# Patient Record
Sex: MALE | Race: WHITE | NOT HISPANIC OR LATINO | URBAN - METROPOLITAN AREA
[De-identification: names, ages, dates, MRNs, and addresses within clinical notes are randomized per-mention and may not be internally consistent; named-entity substitution may affect disease eponyms.]

---

## 2022-07-13 ENCOUNTER — EMERGENCY (EMERGENCY)
Facility: HOSPITAL | Age: 62
LOS: 1 days | Discharge: HOME | End: 2022-07-13
Attending: STUDENT IN AN ORGANIZED HEALTH CARE EDUCATION/TRAINING PROGRAM
Payer: COMMERCIAL

## 2022-07-13 VITALS
SYSTOLIC BLOOD PRESSURE: 167 MMHG | RESPIRATION RATE: 20 BRPM | OXYGEN SATURATION: 97 % | TEMPERATURE: 98 F | WEIGHT: 195.11 LBS | HEART RATE: 64 BPM | DIASTOLIC BLOOD PRESSURE: 90 MMHG | HEIGHT: 68 IN

## 2022-07-13 VITALS
RESPIRATION RATE: 18 BRPM | DIASTOLIC BLOOD PRESSURE: 94 MMHG | SYSTOLIC BLOOD PRESSURE: 155 MMHG | HEART RATE: 63 BPM | TEMPERATURE: 98 F | OXYGEN SATURATION: 97 %

## 2022-07-13 DIAGNOSIS — Z98.89 OTHER SPECIFIED POSTPROCEDURAL STATES: Chronic | ICD-10-CM

## 2022-07-13 LAB
ALBUMIN SERPL ELPH-MCNC: 4.3 G/DL — SIGNIFICANT CHANGE UP (ref 3.3–5.2)
ALP SERPL-CCNC: 71 U/L — SIGNIFICANT CHANGE UP (ref 40–120)
ALT FLD-CCNC: 13 U/L — SIGNIFICANT CHANGE UP
ANION GAP SERPL CALC-SCNC: 12 MMOL/L — SIGNIFICANT CHANGE UP (ref 5–17)
AST SERPL-CCNC: 20 U/L — SIGNIFICANT CHANGE UP
BASOPHILS # BLD AUTO: 0.02 K/UL — SIGNIFICANT CHANGE UP (ref 0–0.2)
BASOPHILS NFR BLD AUTO: 0.3 % — SIGNIFICANT CHANGE UP (ref 0–2)
BILIRUB SERPL-MCNC: 0.3 MG/DL — LOW (ref 0.4–2)
BUN SERPL-MCNC: 28.2 MG/DL — HIGH (ref 8–20)
CALCIUM SERPL-MCNC: 9.4 MG/DL — SIGNIFICANT CHANGE UP (ref 8.6–10.2)
CHLORIDE SERPL-SCNC: 100 MMOL/L — SIGNIFICANT CHANGE UP (ref 98–107)
CO2 SERPL-SCNC: 25 MMOL/L — SIGNIFICANT CHANGE UP (ref 22–29)
CREAT SERPL-MCNC: 0.99 MG/DL — SIGNIFICANT CHANGE UP (ref 0.5–1.3)
EGFR: 86 ML/MIN/1.73M2 — SIGNIFICANT CHANGE UP
EOSINOPHIL # BLD AUTO: 0.09 K/UL — SIGNIFICANT CHANGE UP (ref 0–0.5)
EOSINOPHIL NFR BLD AUTO: 1.2 % — SIGNIFICANT CHANGE UP (ref 0–6)
GLUCOSE SERPL-MCNC: 106 MG/DL — HIGH (ref 70–99)
HCT VFR BLD CALC: 38.9 % — LOW (ref 39–50)
HGB BLD-MCNC: 13 G/DL — SIGNIFICANT CHANGE UP (ref 13–17)
IMM GRANULOCYTES NFR BLD AUTO: 0.1 % — SIGNIFICANT CHANGE UP (ref 0–1.5)
LYMPHOCYTES # BLD AUTO: 1.98 K/UL — SIGNIFICANT CHANGE UP (ref 1–3.3)
LYMPHOCYTES # BLD AUTO: 25.7 % — SIGNIFICANT CHANGE UP (ref 13–44)
MAGNESIUM SERPL-MCNC: 1.2 MG/DL — LOW (ref 1.6–2.6)
MCHC RBC-ENTMCNC: 28.8 PG — SIGNIFICANT CHANGE UP (ref 27–34)
MCHC RBC-ENTMCNC: 33.4 GM/DL — SIGNIFICANT CHANGE UP (ref 32–36)
MCV RBC AUTO: 86.3 FL — SIGNIFICANT CHANGE UP (ref 80–100)
MONOCYTES # BLD AUTO: 0.6 K/UL — SIGNIFICANT CHANGE UP (ref 0–0.9)
MONOCYTES NFR BLD AUTO: 7.8 % — SIGNIFICANT CHANGE UP (ref 2–14)
NEUTROPHILS # BLD AUTO: 5.01 K/UL — SIGNIFICANT CHANGE UP (ref 1.8–7.4)
NEUTROPHILS NFR BLD AUTO: 64.9 % — SIGNIFICANT CHANGE UP (ref 43–77)
PLATELET # BLD AUTO: 173 K/UL — SIGNIFICANT CHANGE UP (ref 150–400)
POTASSIUM SERPL-MCNC: 4.4 MMOL/L — SIGNIFICANT CHANGE UP (ref 3.5–5.3)
POTASSIUM SERPL-SCNC: 4.4 MMOL/L — SIGNIFICANT CHANGE UP (ref 3.5–5.3)
PROT SERPL-MCNC: 7.1 G/DL — SIGNIFICANT CHANGE UP (ref 6.6–8.7)
RBC # BLD: 4.51 M/UL — SIGNIFICANT CHANGE UP (ref 4.2–5.8)
RBC # FLD: 12.1 % — SIGNIFICANT CHANGE UP (ref 10.3–14.5)
SODIUM SERPL-SCNC: 137 MMOL/L — SIGNIFICANT CHANGE UP (ref 135–145)
TROPONIN T SERPL-MCNC: <0.01 NG/ML — SIGNIFICANT CHANGE UP (ref 0–0.06)
WBC # BLD: 7.71 K/UL — SIGNIFICANT CHANGE UP (ref 3.8–10.5)
WBC # FLD AUTO: 7.71 K/UL — SIGNIFICANT CHANGE UP (ref 3.8–10.5)

## 2022-07-13 PROCEDURE — 99285 EMERGENCY DEPT VISIT HI MDM: CPT

## 2022-07-13 PROCEDURE — 93010 ELECTROCARDIOGRAM REPORT: CPT

## 2022-07-13 PROCEDURE — 71045 X-RAY EXAM CHEST 1 VIEW: CPT | Mod: 26

## 2022-07-13 PROCEDURE — 72125 CT NECK SPINE W/O DYE: CPT | Mod: MG

## 2022-07-13 PROCEDURE — 70450 CT HEAD/BRAIN W/O DYE: CPT | Mod: 26,MG

## 2022-07-13 PROCEDURE — 84484 ASSAY OF TROPONIN QUANT: CPT

## 2022-07-13 PROCEDURE — 80053 COMPREHEN METABOLIC PANEL: CPT

## 2022-07-13 PROCEDURE — 96374 THER/PROPH/DIAG INJ IV PUSH: CPT

## 2022-07-13 PROCEDURE — 72125 CT NECK SPINE W/O DYE: CPT | Mod: 26,MG

## 2022-07-13 PROCEDURE — 83735 ASSAY OF MAGNESIUM: CPT

## 2022-07-13 PROCEDURE — G1004: CPT

## 2022-07-13 PROCEDURE — 99285 EMERGENCY DEPT VISIT HI MDM: CPT | Mod: 25

## 2022-07-13 PROCEDURE — 71045 X-RAY EXAM CHEST 1 VIEW: CPT

## 2022-07-13 PROCEDURE — 70450 CT HEAD/BRAIN W/O DYE: CPT | Mod: MG

## 2022-07-13 PROCEDURE — 36415 COLL VENOUS BLD VENIPUNCTURE: CPT

## 2022-07-13 PROCEDURE — 93005 ELECTROCARDIOGRAM TRACING: CPT

## 2022-07-13 PROCEDURE — 85025 COMPLETE CBC W/AUTO DIFF WBC: CPT

## 2022-07-13 RX ORDER — MAGNESIUM SULFATE 500 MG/ML
2 VIAL (ML) INJECTION ONCE
Refills: 0 | Status: COMPLETED | OUTPATIENT
Start: 2022-07-13 | End: 2022-07-13

## 2022-07-13 RX ORDER — MECLIZINE HCL 12.5 MG
1 TABLET ORAL
Qty: 12 | Refills: 0
Start: 2022-07-13 | End: 2022-07-16

## 2022-07-13 RX ADMIN — Medication 25 GRAM(S): at 15:31

## 2022-07-13 NOTE — ED PROVIDER NOTE - NS ED ROS FT
Constitutional: no fever, no sweats, and no chills.  CV: no chest pain, no edema.  Resp: no cough, no dyspnea  GI: no abdominal pain, no nausea and no vomiting.  MSK: no msk pain, no weakness  Skin: no lesions, and no rashes.  Neuro: +LOC, no headache, no sensory deficits, and +dizziness  ROS otherwise negative except as noted in HPI.

## 2022-07-13 NOTE — ED ADULT NURSE NOTE - NSIMPLEMENTINTERV_GEN_ALL_ED
Implemented All Fall Risk Interventions:  [place] to call system. Call bell, personal items and telephone within reach. Instruct patient to call for assistance. Room bathroom lighting operational. Non-slip footwear when patient is off stretcher. Physically safe environment: no spills, clutter or unnecessary equipment. Stretcher in lowest position, wheels locked, appropriate side rails in place. Provide visual cue, wrist band, yellow gown, etc. Monitor gait and stability. Monitor for mental status changes and reorient to person, place, and time. Review medications for side effects contributing to fall risk. Reinforce activity limits and safety measures with patient and family.

## 2022-07-13 NOTE — ED PROVIDER NOTE - PHYSICAL EXAMINATION
General: well appearing, NAD  Head:  NC, small abrasion on R forehead  Eyes: EOMI, PERRLA, no scleral icterus  Nose: midline, no bleeding/drainage  Cardiac: RRR, no m/r/g, no lower extremity edema  Respiratory: CTABL, no wheezes/rales/rhonchi, equal chest wall expansions  Abdomen: soft, ND, NT  MSK/Vascular: full ROM, distal pulses intact, soft compartments, warm extremities  Neuro: AAOx3, negative pronator drift, strength 5/5, sensation to light touch intact, finger to nose coordination intact, cranial nerves 2-12 intact  Psych: calm, cooperative, normal affect

## 2022-07-13 NOTE — ED PROVIDER NOTE - CARE PROVIDER_API CALL
Blanco Brian)  Otolaryngology  [address] [place] [address]  Phone: [phone]  Fax: [phone]  Follow Up Time:

## 2022-07-13 NOTE — ED PROVIDER NOTE - NSFOLLOWUPINSTRUCTIONS_ED_ALL_ED_FT
1. Follow up with your doctor.   2. Return to the ED for any new or worsening symptoms.       Meniere Disease    WHAT YOU NEED TO KNOW:    What is Meniere disease? Meniere disease is a condition that affects the canals of the inner ear. The inner ear helps you hear and maintain your balance. Too much fluid may be produced, or there may not be enough fluid properly absorbed back into the body. This causes swelling and increased pressure in your inner ear.  Ear Anatomy         What causes Meniere disease? The cause is unknown. The following may increase your risk:  •Age between 40 and 60 years      •Smoking, or drinking too much alcohol      •Certain foods and drinks, such as those with high amounts of salt or caffeine      •Stress      •Ear problems, viral infections, or migraines      •Family history of Meniere disease      What are the signs and symptoms of Meniere disease? Signs and symptoms may last from minutes to a few days:  •A feeling that your ears are plugged, full, or have too much pressure      •Hearing loss in one or both ears      •Ringing, roaring, or buzzing in one or both ears      •At least 2 episodes of vertigo (feeling that everything around you is moving, swirling, or spinning) that last longer than 20 minutes      •Nausea, vomiting, and sweating      How is Meniere disease diagnosed? Your healthcare provider will ask what triggered your symptoms, when they started, and how long they lasted. He or she may also ask about your health history and medicines you take or have taken.  •A physical exam will be done. Your healthcare provider may move your head in different directions. You may also be asked to do exercises that make you dizzy.      •An auditory brainstem response (ABR) test is a series of clicks played through headsets on your ears. A machine is used to measure how your cochlea and nerves react to the clicks.      •Blood tests may be done to give information about your overall health.      •A CT or MRI scan may be taken of your head. You may be given contrast liquid before the pictures are taken to help healthcare providers see your inner ear better. Tell the provider if you have ever had an allergic reaction to contrast liquid. Do not enter the MRI room with anything metal. Metal can cause serious injury. Tell the healthcare provider if you have any metal in or on your body.      •Electronystagmography (ENG) is done to test for problems you may have with balance or dizziness. Sticky pads with wires are placed on the skin around your eyes. The wires are connected to a machine that records information during your ENG. Warm and cool air or water is put into your ears while your eye movements are recorded.      How is Meniere disease treated? Treatment will depend on the condition causing your Meniere disease. The goal of treatment is to prevent, decrease, and manage your symptoms. Your healthcare provider may suggest that you rest and avoid certain activities. You may also need any of the following:  •Medicines may be given to relieve your symptoms, such as nausea, dizziness, vomiting, and headache. Your healthcare provider may also inject antibiotics into your ears.      •Surgery to correct certain problems in the ears may be done. This may include removing bone or cutting a nerve in your inner ear. Excess fluids may also be removed.      •Vestibular and balance rehabilitation therapy (VBRT) is a form of exercise therapy. It may be used to help decrease your dizziness, improve your balance, and prevent injuries. It may be done in a center or at home. VBRT includes movement exercises while you sit or stand. These exercises will make you dizzy, but can also help your brain adapt to the triggers that cause your vertigo. Over time, this therapy may decrease the number of times you have vertigo and can help improve your balance.      When should I seek immediate care?   •You are vomiting and your antinausea medicine is not helping.      •You have blood, pus, or fluid coming out of your ears.      •You have dizzy spells that last longer than usual.      •Your symptoms keep coming back or get worse.      When should I call my doctor?   •You have a fever.      •You have questions or concerns about your condition or care.      CARE AGREEMENT:    You have the right to help plan your care. Learn about your health condition and how it may be treated. Discuss treatment options with your healthcare providers to decide what care you want to receive. You always have the right to refuse treatment.

## 2022-07-13 NOTE — ED PROVIDER NOTE - CLINICAL SUMMARY MEDICAL DECISION MAKING FREE TEXT BOX
60yo M w/pmh HTN, pre-diabetes, chronic vertigo presents for fall, +head trauma, assoc/w retrograde amnesia. Labs, CT head/neck pending.

## 2022-07-13 NOTE — ED ADULT NURSE NOTE - CHIEF COMPLAINT QUOTE
Pt c/o dizziness, fall yesterday.  States he drove home from [facility] and does not remember.  Reports feeling unsteady on his feet and having frequent falls.  Denies cp, sob.  Denies blood thinner use.

## 2022-07-13 NOTE — ED PROVIDER NOTE - PATIENT PORTAL LINK FT
You can access the FollowMyHealth Patient Portal offered by [facility] by registering at the following website: [email or web address] By joining [facility]’s FollowMyHealth portal, you will also be able to view your health information using other applications (apps) compatible with our system.

## 2022-07-13 NOTE — ED PROVIDER NOTE - ATTENDING CONTRIBUTION TO CARE
Pt states that yesterday he became dizzy and lost his balance and fell hitting his head. Pt states that he remembers the fall but doesn't remember driving home afterwards. Pt has chronic vertigo causing his dizziness and has had an extensive workup for this.  Pt feeling better today. no n/v. no diarrhea. no numbness/weakness. no other complaints.    physical - rrr. ctab. abd - soft, nt. no edema. no rash.  small forehead abrasion. neuro strength 5/5 x4. sensation intact x4. CN II-XII intact.    plan - labs and imaging reviewed. Pt with a concussion causing anterograde amnesia.  fall caused by his chronic vertigo.  will d/c to home with outpatient f/up and return instructions.

## 2022-07-13 NOTE — ED ADULT TRIAGE NOTE - CHIEF COMPLAINT QUOTE
Pt c/o dizziness, fall yesterday.  States he drove home from [facility] and does not remember.  Reports feeling unsteady on his feet and having frequent falls.  Denies cp, sob. Pt c/o dizziness, fall yesterday.  States he drove home from [facility] and does not remember.  Reports feeling unsteady on his feet and having frequent falls.  Denies cp, sob.  Denies blood thinner use.

## 2022-07-13 NOTE — ED PROVIDER NOTE - OBJECTIVE STATEMENT
60yo M w/pmh HTN, pre-diabetes, chronic vertigo presents for fall. Reports he fell yesterday and hit his head, denies LOC but reports he doesn't remember an extended period after the fall, including driving himself home. Denies CP, SOB, lightheadedness prior to the fall, but reports he is unsure how he fell. Reports he has felt off-balance since October, seen at [facility] with extensive stroke workup including two MRIs, which were negative. Also reports tinnitus and hearing loss for the same time. Reports he was told to follow-up with neurology but hasn't yet.

## 2022-07-13 NOTE — ED PROVIDER NOTE - NSICDXPASTSURGICALHX_GEN_ALL_CORE_FT
Speaking Coherently PAST SURGICAL HISTORY:  S/P hernia repair     S/P tonsillectomy and adenoidectomy

## 2023-09-18 PROBLEM — Z00.00 ENCOUNTER FOR PREVENTIVE HEALTH EXAMINATION: Status: ACTIVE | Noted: 2023-09-18

## 2023-09-21 ENCOUNTER — APPOINTMENT (OUTPATIENT)
Dept: NUCLEAR MEDICINE | Facility: CLINIC | Age: 63
End: 2023-09-21
Payer: COMMERCIAL

## 2023-09-21 ENCOUNTER — OUTPATIENT (OUTPATIENT)
Dept: OUTPATIENT SERVICES | Facility: HOSPITAL | Age: 63
LOS: 1 days | End: 2023-09-21

## 2023-09-21 DIAGNOSIS — R25.1 TREMOR, UNSPECIFIED: ICD-10-CM

## 2023-09-21 DIAGNOSIS — Z98.89 OTHER SPECIFIED POSTPROCEDURAL STATES: Chronic | ICD-10-CM

## 2023-09-21 PROCEDURE — 78803 RP LOCLZJ TUM SPECT 1 AREA: CPT | Mod: 26

## 2023-09-25 ENCOUNTER — TRANSCRIPTION ENCOUNTER (OUTPATIENT)
Age: 63
End: 2023-09-25

## 2023-09-26 ENCOUNTER — APPOINTMENT (OUTPATIENT)
Dept: MRI IMAGING | Facility: CLINIC | Age: 63
End: 2023-09-26
Payer: COMMERCIAL

## 2023-09-26 ENCOUNTER — OUTPATIENT (OUTPATIENT)
Dept: OUTPATIENT SERVICES | Facility: HOSPITAL | Age: 63
LOS: 1 days | End: 2023-09-26

## 2023-09-26 DIAGNOSIS — Z98.89 OTHER SPECIFIED POSTPROCEDURAL STATES: Chronic | ICD-10-CM

## 2023-09-26 DIAGNOSIS — R26.9 UNSPECIFIED ABNORMALITIES OF GAIT AND MOBILITY: ICD-10-CM

## 2023-09-26 DIAGNOSIS — R29.6 REPEATED FALLS: ICD-10-CM

## 2023-09-26 DIAGNOSIS — M48.00 SPINAL STENOSIS, SITE UNSPECIFIED: ICD-10-CM

## 2023-09-26 PROCEDURE — 72141 MRI NECK SPINE W/O DYE: CPT | Mod: 26

## 2023-09-26 PROCEDURE — 72146 MRI CHEST SPINE W/O DYE: CPT | Mod: 26

## 2023-09-26 PROCEDURE — 72148 MRI LUMBAR SPINE W/O DYE: CPT | Mod: 26

## 2023-12-06 ENCOUNTER — APPOINTMENT (OUTPATIENT)
Dept: ORTHOPEDIC SURGERY | Facility: CLINIC | Age: 63
End: 2023-12-06
Payer: COMMERCIAL

## 2023-12-06 VITALS — BODY MASS INDEX: 30.31 KG/M2 | WEIGHT: 200 LBS | HEIGHT: 68 IN

## 2023-12-06 DIAGNOSIS — G20 PARKINSON'S DISEASE: ICD-10-CM

## 2023-12-06 DIAGNOSIS — I10 ESSENTIAL (PRIMARY) HYPERTENSION: ICD-10-CM

## 2023-12-06 DIAGNOSIS — M50.20 OTHER CERVICAL DISC DISPLACEMENT, UNSPECIFIED CERVICAL REGION: ICD-10-CM

## 2023-12-06 DIAGNOSIS — Z78.9 OTHER SPECIFIED HEALTH STATUS: ICD-10-CM

## 2023-12-06 DIAGNOSIS — E11.9 TYPE 2 DIABETES MELLITUS W/OUT COMPLICATIONS: ICD-10-CM

## 2023-12-06 DIAGNOSIS — F02.80 PARKINSON'S DISEASE: ICD-10-CM

## 2023-12-06 DIAGNOSIS — M47.814 SPONDYLOSIS W/OUT MYELOPATHY OR RADICULOPATHY, THORACIC REGION: ICD-10-CM

## 2023-12-06 PROCEDURE — 99203 OFFICE O/P NEW LOW 30 MIN: CPT

## 2023-12-06 RX ORDER — LOSARTAN POTASSIUM 100 MG/1
TABLET, FILM COATED ORAL
Refills: 0 | Status: ACTIVE | COMMUNITY

## 2023-12-06 RX ORDER — METFORMIN HYDROCHLORIDE 625 MG/1
TABLET ORAL
Refills: 0 | Status: ACTIVE | COMMUNITY

## 2023-12-13 NOTE — HISTORY OF PRESENT ILLNESS
[de-identified] : 12/06/2023: Patient presenting today for an initial evaluation. Reports hx of low back pain for years, worsened past 6 months.  Pain is primarily in rt side of back. He reports weakness in legs due to Parkinson's disease. Treated with OTC Tylenol and lidocaine patches. He reports worsening of handwriting. Seen by Neurologist. Hx of diabetes, and HPB.   PMHx: Parkinson's Disease    [] : no [FreeTextEntry3] : 3 years ago  [FreeTextEntry5] : no known injury. 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

## 2023-12-13 NOTE — PHYSICAL EXAM
[de-identified] : Constitutional: - General Appearance: Unremarkable Body Habitus Well Developed Well Nourished Body Habitus No Deformities Well Groomed Ability To communicate: Normal Neurologic: Global sensation is intact to upper and lower extremities. See examination of Neck and/or Spine for exceptions. Orientation to Time, Place and Person is: Normal Mood And Affect is Normal Skin: - Head/Face, Right Upper/Lower Extremity, Left Upper/Lower Extremity: Normal See Examination of Neck and/or Spine for exceptions Cardiovascular: Peripheral Cardiovascular System is Normal Palpation of Lymph Nodes: Normal Palpation of lymph nodes in: Axilla, Cervical, Inguinal Abnormal Palpation of lymph nodes in: None  [] : negative Louis reflex [FreeTextEntry8] : indicates pain rt paraspinal L4-S1 [de-identified] : 3+ hyper-refleixa in patella

## 2023-12-13 NOTE — DATA REVIEWED
[FreeTextEntry1] : On my interpretation of these images from  9/26/23. I have additionally reviewed the radiologist report.  MRI images were reviewed on today's visit.  L5-S1: mild to mod ddd central disc protrusion mod left facet, mod left fs, low takeoff of left L5 nerve root L4-5: mild ddd mild facet degeneration mild stenosis mod left fs  L3-4: mild ddd broad based protrusion mild stenosis L2-3: mild ddd L1-2:nl T12-L1: nl   On my interpretation of these images from  9/26/23. I have additionally reviewed the radiologist report.  MRI images were reviewed on today's visit.  C2-3: NL  C3-4: mild ddd disc bulge  C4-5: mild ddd small central herniation minimal stenosis  C5-6: mild ddd broad based protrusion mild central stenosis mild rt fs  C6-7: mod ddd central protrusion mild to mod stenosis  C7-T1:  mild ddd disc bulge t1/2: nl  *at the rt c5/6, and left c67 nerve root sleeve cyst.   On my interpretation of these images from [facility] I have additionally reviewed the radiologist report.  MRI images were reviewed on today's visit. T SPINE reveals multi-level mod ddd with osteophyte formation, multi level nerve root sleeve cyst

## 2023-12-13 NOTE — DISCUSSION/SUMMARY
[de-identified] : 62 y/o male with lumbar FS, RT L4/5 L5/S1 thoracic spondylosis, cervical HNP, not demonstrating myelopathic signs. Patient was provided with a referral for lumbar physical therapy to work on stretching, strengthening and range of motion. Patient was provided with a lumbar home exercise program. I am referring the patient for a RT L4-S1 TFESI VS MBB/RFA. F/U as needed.   Prior to appointment and during encounter with patient extensive medical records were reviewed including but not limited to, hospital records, out patient records, imaging results, and lab data. During this appointment the patient was examined, diagnoses were discussed and explained in a face to face manner. In addition extensive time was spent reviewing aforementioned diagnostic studies. Counseling including abnormal image results, differential diagnoses, treatment options, risk and benefits, lifestyle changes, current condition, and current medications was performed. Patient's comments, questions, and concerns were address and patient verbalized understanding. Based on this patient's presentation at our office, which is an orthopedic spine surgeon's office, this patient inherently / intrinsically has a risk, however minute, of developing issues such as Cauda equina syndrome, bowel and bladder changes, or progression of motor or neurological deficits such as paralysis which may be permanent.   I, Daphne Hernandez, attest that this documentation has been prepared under the direction and in the presence of provider Jerod Karimi MD.

## 2024-02-06 ENCOUNTER — APPOINTMENT (OUTPATIENT)
Dept: PAIN MANAGEMENT | Facility: CLINIC | Age: 64
End: 2024-02-06
Payer: COMMERCIAL

## 2024-02-06 VITALS — WEIGHT: 200 LBS | BODY MASS INDEX: 30.31 KG/M2 | HEIGHT: 68 IN

## 2024-02-06 PROCEDURE — 99204 OFFICE O/P NEW MOD 45 MIN: CPT

## 2024-02-06 NOTE — PHYSICAL EXAM
[de-identified] : Constitutional:   - No acute distress   - Well developed; well nourished    Neurological:   - normal mood and affect   - alert and oriented x 3     Cardiovascular:   - Trace edema bilaterally  Lumbar Spine Exam:   Inspection:  erythema (-)  ecchymosis (-)  rashes (-)  alignment: no scoliosis   Palpation:  Midline lumbar tenderness:            (-)  midline thoracic tenderness:          (-)  Lumbar paraspinal tenderness:  L (-) ; R (-)  thoracic paraspinal tenderness: L (-) ; R (-)  sciatic nerve tenderness :          L (-) ; R (-)  SI joint tenderness:                     L (-) ; R (-)  GTB tenderness:                        L (-);  R (-)   ROM: WNL  Strength:                                     Right       Left     Hip Flexion:                (5/5)       (5/5)  Quadriceps:               (5/5)       (5/5)  Hamstrings:                (5/5)       (5/5)  Ankle Dorsiflexion:     (5/5)       (5/5)  EHL:                           (5/5)       (5/5)  Ankle Plantarflexion:  (5/5)       (5/5)   Special Tests:  SLR:                            R (=) ; L (=)  Facet loading:             R (-) ; L (-)  HUAN test:                R (-) ; L (-)  Hamstring tightness:   R (+);  L (+)   Neurologic:  SILT throughout right lower extremity  SILT throughout left lower extremity   Reflexes normal and symmetric bilateral lower extremities   Gait:  Shuffling gait / unsteady ambulates with cane

## 2024-02-06 NOTE — HISTORY OF PRESENT ILLNESS
[Lower back] : lower back [Sudden] : sudden [8] : 8 [2] : 2 [Dull/Aching] : dull/aching [Constant] : constant [Household chores] : household chores [Leisure] : leisure [Sleep] : sleep [Social interactions] : social interactions [Meds] : meds [Sitting] : sitting [Standing] : standing [Walking] : walking [Bending forward] : bending forward [Extending back] : extending back [Lying in bed] : lying in bed [FreeTextEntry1] : The patient presents for initial evaluation regarding their low back pain. Patient was referred by Dr. Karimi. Patient complains of pain in the right side of the lower back, over the past year he has been having a progressive worsening of pain, he denies any significant radicular pain to the lower extremities; his pain is exacerbated with prolonged standing and walking, laying down will provide some pain relief. He has recently started PT and takes Tylenol and Advil PRN for pain management.   Subjective weakness: No  Lower extremity paresthesias: No  Bladder/bowel dysfunction: No   Injections: No   Pertinent Surgical History: N/A   Imagin) MRI Lumbar Spine (2023) - [facility]  2) MRI Cervical Spine (2023) - [facility]    Physician Disclaimer: I have personally reviewed and confirmed all HPI data with the patient.  [] : no [de-identified] : Lumbar mri at [facility]

## 2024-02-06 NOTE — ASSESSMENT
[FreeTextEntry1] : A discussion regarding available pain management treatment options occurred with the patient.  These included interventional, rehabilitative, pharmacological, and alternative modalities. We will proceed with the following:    Interventional treatment options:   - Proceed with right PM L4-L5 LESI with fluoroscopic guidance - see additional instructions below    Rehabilitative options: - continue physical therapy   - participation in active HEP was discussed and encouraged as tolerated  Medication based treatment options:   - initiate trial of meloxicam 7.5-15 mg daily as needed - continue Tylenol 500-1000 mg up to TID as needed - see additional instructions below    Complementary treatment options:   - Weight management and lifestyle modifications discussed    Additional treatment recommendations as follows:   -Follow-up with Dr. Karimi as directed - Follow up 1-2 weeks post injection for assessment of efficacy and further treatment recommendations  We have discussed the risks, benefits, and alternatives for NSAID therapy including but not limited to the risk of bleeding, thrombosis, gastric mucosal irritation/ulceration, allergic reaction and kidney dysfunction.  The patient verbalizes an understanding.  The risks, benefits and alternatives of the proposed procedure were explained in detail with the patient. The risks outlined include but are not limited to infection, bleeding, post- dural puncture headache, nerve injury, a temporary increase in pain, failure to resolve symptoms, need for future interventions, allergic reaction, and possible elevation of blood sugar in diabetics if using corticosteroid.  All questions were answered to patient's apparent satisfaction, and he/she verbalized an understanding.  The documentation recorded by the scribe, in my presence, accurately reflects the service I personally performed and the decisions made by me with my edits as appropriate.   I, Edenilson Vallejo acting as scribe, attest that this documentation has been prepared under the direction and in the presence of Provider Mau Corbett DO.

## 2024-03-01 ENCOUNTER — APPOINTMENT (OUTPATIENT)
Dept: PAIN MANAGEMENT | Facility: CLINIC | Age: 64
End: 2024-03-01
Payer: MEDICARE

## 2024-03-01 PROCEDURE — 62323 NJX INTERLAMINAR LMBR/SAC: CPT

## 2024-03-01 NOTE — PROCEDURE
[FreeTextEntry3] : Date of Service: 03/01/2024   Account: [id number]  Patient: SERGEI BAJWA   YOB: 1960  Age: 63 year  Surgeon:      Mau Corbett DO  Assistant:    None  Pre-Operative Diagnosis:         Lumbosacral Radiculitis (M54.17)  Post Operative Diagnosis:       Lumbosacral Radiculitis (M54.17)     Procedure:             Lumbar interlaminar (L4-5) epidural steroid injection under fluoroscopic guidance  Anesthesia:           MAC  This procedure was carried out using fluoroscopic guidance.  The risks and benefits of the procedure were discussed extensively with the patient.  The consent of the patient was obtained and the following procedure was performed. The patient was placed in the prone position on the fluoroscopy table and the area was prepped and draped in a sterile fashion.  A timeout was performed with all essential staff present and the site and side were verified.  The patient was placed in the prone position with a pillow under the abdomen to minimize the lumbar lordosis.  The lumbar area was prepped and draped in a sterile fashion.  Under A/P view with slight cephalad-caudad angulation, the L4-5 interspace was identified and marked.  Using sterile technique the superficial skin was anesthetized with 1% Lidocaine.  A 20 gauge Tuohy needle was advanced into the epidural space under fluoroscopy using loss of resistance at the L4-5 level.  After negative aspiration for heme or CSF, an epidurogram was obtained in the A/P and lateral fluoroscopic views using 2-3 cc of Omnipaque contrast confirming epidural placement of the needle.  After this, 5 cc of a mixture of preservative free normal saline and 40 mg of Kenalog were injected into the epidural space.   Vital signs remained normal throughout the procedure.  The patient tolerated the procedure well.  There were no immediate complications from the performed procedure.  The patient was instructed to apply ice over the injection sites for twenty minutes every two hours for the next 24 hours.  Disposition:      1. The patient was advised to F/U in 1-2 weeks to assess the response to the injection.      2. The patient was also instructed to contact me immediately if there were any concerns related to the procedure performed.

## 2024-03-19 ENCOUNTER — APPOINTMENT (OUTPATIENT)
Dept: PAIN MANAGEMENT | Facility: CLINIC | Age: 64
End: 2024-03-19
Payer: MEDICARE

## 2024-03-19 VITALS — BODY MASS INDEX: 30.31 KG/M2 | WEIGHT: 200 LBS | HEIGHT: 68 IN

## 2024-03-19 PROCEDURE — 99214 OFFICE O/P EST MOD 30 MIN: CPT

## 2024-03-19 NOTE — HISTORY OF PRESENT ILLNESS
[Lower back] : lower back [Sudden] : sudden [8] : 8 [2] : 2 [Dull/Aching] : dull/aching [Constant] : constant [Household chores] : household chores [Leisure] : leisure [Sleep] : sleep [Meds] : meds [Social interactions] : social interactions [Sitting] : sitting [Standing] : standing [Walking] : walking [Bending forward] : bending forward [Extending back] : extending back [Lying in bed] : lying in bed [FreeTextEntry1] : 3/19/2024 - Patient presents for FUV after a L4-5 LESI on 3/1/2024. Patient reports no change in pain levels s/p epidural, he continues to have focal right sided lower back pain.  He still denies any radicular pain to the lower extremities.  Prolonged sitting and standing will exacerbate his pain, he wakes up with the pain and activity throughout the day will make it worse. Patient has been using meloxicam PRN with no meaningful benefit.  Unable to tolerate physical therapy due to increased pain.  In addition patient expresses difficulty with traveling  2024 - The patient presents for initial evaluation regarding their low back pain. Patient was referred by Dr. Karimi. Patient complains of pain in the right side of the lower back, over the past year he has been having a progressive worsening of pain, he denies any significant radicular pain to the lower extremities; his pain is exacerbated with prolonged standing and walking, laying down will provide some pain relief. He has recently started PT and takes Tylenol and Advil PRN for pain management.   Injections:  1) L4-L5 LESI (3/1/2024)  Pertinent Surgical History: N/A   Imagin) MRI Lumbar Spine (2023) - [facility]  2) MRI Cervical Spine (2023) - [facility]    Physician Disclaimer: I have personally reviewed and confirmed all HPI data with the patient.  [] : no [de-identified] : Lumbar mri at [facility]

## 2024-03-19 NOTE — DATA REVIEWED
[MRI] : MRI [Report was reviewed and noted in the chart] : The report was reviewed and noted in the chart [Lumbar Spine] : lumbar spine [I reviewed the films/CD] : I reviewed the films/CD

## 2024-03-19 NOTE — PHYSICAL EXAM
[de-identified] : Constitutional:   - No acute distress   - Well developed; well nourished    Neurological:   - normal mood and affect   - alert and oriented x 3     Cardiovascular:   - Trace edema bilaterally  Lumbar Spine Exam:   Inspection:  erythema (-)  ecchymosis (-)  rashes (-)  alignment: no scoliosis   Palpation:  Midline lumbar tenderness:            (-)  midline thoracic tenderness:          (-)  Lumbar paraspinal tenderness:  L (-) ; R (+)  thoracic paraspinal tenderness: L (-) ; R (-)  sciatic nerve tenderness :          L (-) ; R (-)  SI joint tenderness:                     L (-) ; R (-)  GTB tenderness:                        L (-);  R (-)   ROM: WNL; stiffness throughout ROM testing Pain with extremes of extension  Strength:                                     Right       Left     Hip Flexion:                (5/5)       (5/5)  Quadriceps:               (5/5)       (5/5)  Hamstrings:                (5/5)       (5/5)  Ankle Dorsiflexion:     (5/5)       (5/5)  EHL:                           (5/5)       (5/5)  Ankle Plantarflexion:  (5/5)       (5/5)   Special Tests:  SLR:                            R (=) ; L (-)  Facet loading:             R (+) ; L (-)  HUAN test:                R (-) ; L (-)  Hamstring tightness:   R (+);  L (+)   Neurologic:  SILT throughout right lower extremity  SILT throughout left lower extremity   Reflexes normal and symmetric bilateral lower extremities   Gait:  Shuffling gait / unsteady ambulates with cane

## 2024-04-10 ENCOUNTER — APPOINTMENT (OUTPATIENT)
Dept: PAIN MANAGEMENT | Facility: CLINIC | Age: 64
End: 2024-04-10
Payer: MEDICARE

## 2024-04-10 PROCEDURE — 64484 NJX AA&/STRD TFRM EPI L/S EA: CPT | Mod: RT,59

## 2024-04-10 PROCEDURE — 64483 NJX AA&/STRD TFRM EPI L/S 1: CPT | Mod: RT

## 2024-04-10 NOTE — PROCEDURE
[FreeTextEntry3] : Date of Service: 04/10/2024   Account: [id number]  Patient: SERGEI BAJWA   YOB: 1960  Age: 63 year  Surgeon:   Mau Corbett DO  Assistant:  None  Pre-Operative Diagnosis:   Lumbosacral radiculopathy  Post Operative Diagnosis:  Lumbosacral radiculopathy  Procedure:             Right L4-L5, L5-S1 transforaminal epidural steroid injection under fluoroscopic guidance.  Anesthesia:            MAC   This procedure was carried out using fluoroscopic guidance.  The risks and benefits of the procedure were discussed extensively with the patient.  The consent of the patient was obtained and the following procedure was performed.  A timeout was performed with all essential staff present and the site and side were verified.  The right L4-L5 neural foramen was then identified on right oblique "edu dog" anatomical view at the 6 o' clock position using fluoroscopic guidance, and the area was marked. The overlying skin and subcutaneous structures were anesthetized using sterile technique with 1% Lidocaine.   A 22 gauge 3.5 inch spinal needle was directed toward the inferior (6 o'clock) position of the pedicle, which formed the roof of the identified foramen.  Once in the epidural space, after negative aspiration for heme and CSF, 1cc of Omnipaque contrast was injected to confirm epidural location and assess filling defects and rule out intravascular needle placement.  Lumbar epidurogram showed no intravascular or intrathecal flow pattern.  No blood or CSF was aspirated.  Omnipaque spread medially in epidural space and outlined the exiting nerve root.  After this, 2.5 cc of a mixture of 4 cc of preservative free normal saline plus 40 mg of Kenalog was injected in the epidural space.  The right L5-S1 neural foramen and was then identified on right oblique "edu dog" anatomical view at the 6 o' clock position using fluoroscopic guidance, and the area was marked.  The overlying skin and subcutaneous structures were anesthetized using sterile technique with 1% Lidocaine.   A 22 gauge 3.5 inch spinal needle was directed toward the inferior (6 o'clock) position of the pedicle, which formed the roof of the identified foramen.  Once in the epidural space, after negative aspiration for heme and CSF, 1cc of Omnipaque contrast was injected to confirm epidural location and assess filling defects and rule out intravascular needle placement.   Lumbar epidurogram showed no intravascular or intrathecal flow pattern.  No blood or CSF was aspirated.  Omnipaque spread medially in epidural space and outlined the exiting nerve root.   After this, the remainder of the injectate listed above was injected in the epidural space.  Vital signs remained normal throughout the procedure.  The patient tolerated the procedure well.  There were no immediate complications from the performed procedure.  The patient was instructed to apply ice over the injection sites for twenty minutes every two hours for the next 24 hours.  Disposition:      1. The patient was advised to F/U in 1-2 weeks to assess the response to the injection.      2. The patient was also instructed to contact me immediately if there were any concerns related to the procedure performed.

## 2024-04-22 ENCOUNTER — APPOINTMENT (OUTPATIENT)
Dept: PAIN MANAGEMENT | Facility: CLINIC | Age: 64
End: 2024-04-22
Payer: MEDICARE

## 2024-04-22 VITALS — WEIGHT: 194 LBS | HEIGHT: 68 IN | BODY MASS INDEX: 29.4 KG/M2

## 2024-04-22 DIAGNOSIS — M47.816 SPONDYLOSIS W/OUT MYELOPATHY OR RADICULOPATHY, LUMBAR REGION: ICD-10-CM

## 2024-04-22 DIAGNOSIS — M54.16 RADICULOPATHY, LUMBAR REGION: ICD-10-CM

## 2024-04-22 DIAGNOSIS — M48.061 SPINAL STENOSIS, LUMBAR REGION WITHOUT NEUROGENIC CLAUDICATION: ICD-10-CM

## 2024-04-22 PROCEDURE — 99214 OFFICE O/P EST MOD 30 MIN: CPT

## 2024-04-22 RX ORDER — MELOXICAM 15 MG/1
15 TABLET ORAL
Qty: 30 | Refills: 1 | Status: DISCONTINUED | COMMUNITY
Start: 2024-02-06 | End: 2024-04-22

## 2024-04-22 RX ORDER — NAPROXEN 500 MG/1
500 TABLET ORAL TWICE DAILY
Qty: 60 | Refills: 1 | Status: ACTIVE | COMMUNITY
Start: 2024-04-22 | End: 1900-01-01

## 2024-04-22 NOTE — PHYSICAL EXAM
[de-identified] : Constitutional:   - No acute distress   - Well developed; well nourished    Neurological:   - normal mood and affect   - alert and oriented x 3     Cardiovascular:   - Trace edema bilaterally LE  Lumbar Spine Exam:   Inspection:  erythema (-)  ecchymosis (-)  rashes (-)  alignment: no scoliosis   Palpation:  Midline lumbar tenderness:            (-)  midline thoracic tenderness:          (-)  Lumbar paraspinal tenderness:  L (-) ; R (-)  thoracic paraspinal tenderness: L (-) ; R (-)  sciatic nerve tenderness :          L (-) ; R (-)  SI joint tenderness:                     L (-) ; R (-)  GTB tenderness:                        L (-);  R (-)   ROM: WNL; stiffness throughout ROM testing Pain with extremes of extension  Strength:                                     Right       Left     Hip Flexion:                (5/5)       (5/5)  Quadriceps:               (5/5)       (5/5)  Hamstrings:                (5/5)       (5/5)  Ankle Dorsiflexion:     (5/5)       (5/5)  EHL:                           (5/5)       (5/5)  Ankle Plantarflexion:  (5/5)       (5/5)   Special Tests:  SLR:                            R (-) ; L (-)  Facet loading:             R (+) ; L (-)  HUAN test:                R (-) ; L (-)  Hamstring tightness:   R (+);  L (+)   Neurologic:  SILT throughout right lower extremity  SILT throughout left lower extremity   Reflexes normal and symmetric bilateral lower extremities   Gait:  Shuffling gait / unsteady ambulates with assistance of cane

## 2024-04-22 NOTE — HISTORY OF PRESENT ILLNESS
[Lower back] : lower back [Sudden] : sudden [8] : 8 [2] : 2 [Dull/Aching] : dull/aching [Constant] : constant [Household chores] : household chores [Leisure] : leisure [Sleep] : sleep [Social interactions] : social interactions [Meds] : meds [Sitting] : sitting [Standing] : standing [Walking] : walking [Bending forward] : bending forward [Extending back] : extending back [Lying in bed] : lying in bed [FreeTextEntry1] : 2024 - Patient presents for FUV after a right L4-5, L5-S1 TFESI on 4/10/2024. Patient reports a 90% reduction of lower back pain, he still has no radicular symptoms.  His lower back pain is well managed; he is using meloxicam PRN for pain management.   3/19/2024 - Patient presents for FUV after a L4-5 LESI on 3/1/2024. Patient reports no change in pain levels s/p epidural, he continues to have focal right sided lower back pain.  He still denies any radicular pain to the lower extremities.  Prolonged sitting and standing will exacerbate his pain, he wakes up with the pain and activity throughout the day will make it worse. Patient has been using meloxicam PRN with no meaningful benefit.  Unable to tolerate physical therapy due to increased pain.  In addition patient expresses difficulty with traveling  2024 - The patient presents for initial evaluation regarding their low back pain. Patient was referred by Dr. Karimi. Patient complains of pain in the right side of the lower back, over the past year he has been having a progressive worsening of pain, he denies any significant radicular pain to the lower extremities; his pain is exacerbated with prolonged standing and walking, laying down will provide some pain relief. He has recently started PT and takes Tylenol and Advil PRN for pain management.   Injections:  1) L4-L5 LESI (3/1/2024) 2) Right L4-5, L5-S1 TFESI (4/10/2024)  Pertinent Surgical History: N/A   Imagin) MRI Lumbar Spine (2023) - [facility]  2) MRI Cervical Spine (2023) - [facility]    Physician Disclaimer: I have personally reviewed and confirmed all HPI data with the patient.  [] : no [de-identified] : Lumbar mri at [facility]

## 2024-04-22 NOTE — ASSESSMENT
[FreeTextEntry1] : A discussion regarding available pain management treatment options occurred with the patient.  These included interventional, rehabilitative, pharmacological, and alternative modalities. We will proceed with the following:    Interventional treatment options:   - Would likely proceed with right L4-L5, L5-S1 TFESI (80 mg Kenalog) with return of severe pain - Consider right lumbar facet directed intervention with ongoing axial low back pain; informational materials provided - see additional instructions below    Rehabilitative options: - Recommend retrial of physical therapy; patient continues to defer given logistical concerns - participation in active HEP was discussed and encouraged as tolerated  Medication based treatment options:   - Discontinue meloxicam; start Naprosyn 500 mg up to BID as needed - continue Tylenol 500-1000 mg up to TID as needed - see additional instructions below    Complementary treatment options:   - Weight management and lifestyle modifications discussed - Modest benefit with prior chiropractic; consider retrial  Additional treatment recommendations as follows:   - Follow-up in 3 months or as needed basis  We have discussed the risks, benefits, and alternatives for NSAID therapy including but not limited to the risk of bleeding, thrombosis, gastric mucosal irritation/ulceration, allergic reaction and kidney dysfunction.  The patient verbalizes an understanding.  The documentation recorded by the scribe, in my presence, accurately reflects the service I personally performed and the decisions made by me with my edits as appropriate.   I, Edenilson Vallejo acting as scribe, attest that this documentation has been prepared under the direction and in the presence of Provider Mau Corbett DO.

## 2024-09-10 ENCOUNTER — APPOINTMENT (OUTPATIENT)
Dept: PAIN MANAGEMENT | Facility: CLINIC | Age: 64
End: 2024-09-10
Payer: MEDICARE

## 2024-09-10 VITALS — HEIGHT: 68 IN

## 2024-09-10 DIAGNOSIS — M47.816 SPONDYLOSIS W/OUT MYELOPATHY OR RADICULOPATHY, LUMBAR REGION: ICD-10-CM

## 2024-09-10 DIAGNOSIS — M47.814 SPONDYLOSIS W/OUT MYELOPATHY OR RADICULOPATHY, THORACIC REGION: ICD-10-CM

## 2024-09-10 PROCEDURE — 99214 OFFICE O/P EST MOD 30 MIN: CPT

## 2024-09-10 RX ORDER — TRAMADOL HYDROCHLORIDE 50 MG/1
50 TABLET, COATED ORAL TWICE DAILY
Qty: 30 | Refills: 0 | Status: ACTIVE | COMMUNITY
Start: 2024-09-10 | End: 1900-01-01

## 2024-09-10 NOTE — HISTORY OF PRESENT ILLNESS
[Lower back] : lower back [Sudden] : sudden [8] : 8 [2] : 2 [Dull/Aching] : dull/aching [Constant] : constant [Household chores] : household chores [Leisure] : leisure [Sleep] : sleep [Social interactions] : social interactions [Meds] : meds [Sitting] : sitting [Standing] : standing [Walking] : walking [Bending forward] : bending forward [Extending back] : extending back [Lying in bed] : lying in bed [Mid-back] : mid-back [10] : 10 [Stabbing] : stabbing [FreeTextEntry1] : 9/10/24 - Patient presents for FUV regarding their lower back pain.  He was last seen nearly 5 months ago.  Patient reports a steady worsening of symptoms since he was seen.  Pain is predominately in the mid to lower back.  He denies any particular inciting events however upon further questioning patient reports a history of multiple falls.  One of his falls resulted in a trip to the emergency room which she reports did not reveal any acute fracture.  Reports that Naprosyn was effective in the past.  He still has a very challenging living situation as he resides on the second floor of a home.  He is currently working to secure a living quarters on a ground floor.  2024 - Patient presents for FUV after a right L4-5, L5-S1 TFESI on 4/10/2024. Patient reports a 90% reduction of lower back pain, he still has no radicular symptoms.  His lower back pain is well managed; he is using meloxicam PRN for pain management.   3/19/2024 - Patient presents for FUV after a L4-5 LESI on 3/1/2024. Patient reports no change in pain levels s/p epidural, he continues to have focal right sided lower back pain.  He still denies any radicular pain to the lower extremities.  Prolonged sitting and standing will exacerbate his pain, he wakes up with the pain and activity throughout the day will make it worse. Patient has been using meloxicam PRN with no meaningful benefit.  Unable to tolerate physical therapy due to increased pain.  In addition patient expresses difficulty with traveling  2024 - The patient presents for initial evaluation regarding their low back pain. Patient was referred by Dr. Karimi. Patient complains of pain in the right side of the lower back, over the past year he has been having a progressive worsening of pain, he denies any significant radicular pain to the lower extremities; his pain is exacerbated with prolonged standing and walking, laying down will provide some pain relief. He has recently started PT and takes Tylenol and Advil PRN for pain management.   Injections:  1) L4-L5 LESI (3/1/2024) 2) Right L4-5, L5-S1 TFESI (4/10/2024)  Pertinent Surgical History: N/A   Imagin) MRI Lumbar Spine (2023) - [facility]  2) MRI Cervical Spine (2023) - [facility]    Physician Disclaimer: I have personally reviewed and confirmed all HPI data with the patient.  [] : no [FreeTextEntry6] : spasms  [de-identified] : Lumbar mri at [facility]

## 2024-09-10 NOTE — PHYSICAL EXAM
[de-identified] : Constitutional:   - Mild distress    Neurological:   - Resting tremor - Bradykinesia - alert and oriented x 3     Cardiovascular:   - Trace edema bilaterally LE  Lumbar Spine Exam:   Inspection:  erythema (-)  ecchymosis (-)  rashes (-)  alignment: no scoliosis   Palpation:  Midline lumbar tenderness:            (+)  midline thoracic tenderness:          (+)  Lumbar paraspinal tenderness:  L (+) ; R (+)  thoracic paraspinal tenderness: L (-) ; R (-)  sciatic nerve tenderness :          L (-) ; R (-)  SI joint tenderness:                     L (-) ; R (-)  GTB tenderness:                        L (-);  R (-)   ROM: Reduced all planes Pain throughout ROM testing  Strength:                                     Right       Left     Hip Flexion:                (5/5)       (5/5)  Quadriceps:               (5/5)       (5/5)  Hamstrings:                (5/5)       (5/5)  Ankle Dorsiflexion:     (5/5)       (5/5)  EHL:                           (5/5)       (5/5)  Ankle Plantarflexion:  (5/5)       (5/5)   Special Tests:  SLR:                            R (-) ; L (-)  Facet loading:             R (+) ; L (+)  HUAN test:                R (n/a) ; L (n/a)  Hamstring tightness:   R (+);  L (+)   Neurologic:  SILT throughout right lower extremity  SILT throughout left lower extremity   Reflexes normal and symmetric bilateral lower extremities   Gait:  Shuffling gait / unsteady ambulates with assistance of cane

## 2024-09-10 NOTE — PHYSICAL EXAM
[de-identified] : Constitutional:   - Mild distress    Neurological:   - Resting tremor - Bradykinesia - alert and oriented x 3     Cardiovascular:   - Trace edema bilaterally LE  Lumbar Spine Exam:   Inspection:  erythema (-)  ecchymosis (-)  rashes (-)  alignment: no scoliosis   Palpation:  Midline lumbar tenderness:            (+)  midline thoracic tenderness:          (+)  Lumbar paraspinal tenderness:  L (+) ; R (+)  thoracic paraspinal tenderness: L (-) ; R (-)  sciatic nerve tenderness :          L (-) ; R (-)  SI joint tenderness:                     L (-) ; R (-)  GTB tenderness:                        L (-);  R (-)   ROM: Reduced all planes Pain throughout ROM testing  Strength:                                     Right       Left     Hip Flexion:                (5/5)       (5/5)  Quadriceps:               (5/5)       (5/5)  Hamstrings:                (5/5)       (5/5)  Ankle Dorsiflexion:     (5/5)       (5/5)  EHL:                           (5/5)       (5/5)  Ankle Plantarflexion:  (5/5)       (5/5)   Special Tests:  SLR:                            R (-) ; L (-)  Facet loading:             R (+) ; L (+)  HUAN test:                R (n/a) ; L (n/a)  Hamstring tightness:   R (+);  L (+)   Neurologic:  SILT throughout right lower extremity  SILT throughout left lower extremity   Reflexes normal and symmetric bilateral lower extremities   Gait:  Shuffling gait / unsteady ambulates with assistance of cane

## 2024-09-10 NOTE — ASSESSMENT
[FreeTextEntry1] : A discussion regarding available pain management treatment options occurred with the patient.  These included interventional, rehabilitative, pharmacological, and alternative modalities. We will proceed with the following:    Interventional treatment options: - None indicated present time - non sustained relief with the BRANT x 2 - Consider lumbar facet directed intervention with ongoing axial low back pain; informational materials provided at prior visit - see additional instructions below    Rehabilitative options: - Previously recommend retrial of physical therapy; patient continues to defer given logistical concerns - participation in active HEP was discussed and encouraged as tolerated  Medication based treatment options:   - Continue Naprosyn 500 mg up to BID as needed - Initiate trial of tramadol 50 mg up to TID as needed for severe pain exacerbations - continue Tylenol 500-1000 mg up to TID as needed - see additional instructions below    Complementary treatment options:   - Weight management and lifestyle modifications discussed - Modest benefit with prior chiropractic; consider retrial  Additional treatment recommendations as follows:   - Obtain MRI lumbar/thoracic spine; evaluate for VCF - Follow-up for MRI imaging review  We have discussed the risks, benefits, and alternatives for NSAID therapy including but not limited to the risk of bleeding, thrombosis, gastric mucosal irritation/ulceration, allergic reaction and kidney dysfunction.  The patient verbalizes an understanding.  The risks, benefits, and alternatives of opioid medications was discussed.  These include but not limited to the risk of addiction, overdose, respiratory depression, and death.  The patient was also informed that they should not consume alcohol or drive a motor vehicle under any circumstances while taking opiate pain medications.  They verbalized understanding of what was discussed.

## 2024-09-10 NOTE — HISTORY OF PRESENT ILLNESS
[Lower back] : lower back [Sudden] : sudden [8] : 8 [2] : 2 [Dull/Aching] : dull/aching [Constant] : constant [Household chores] : household chores [Leisure] : leisure [Sleep] : sleep [Social interactions] : social interactions [Meds] : meds [Sitting] : sitting [Standing] : standing [Walking] : walking [Bending forward] : bending forward [Extending back] : extending back [Lying in bed] : lying in bed [Mid-back] : mid-back [10] : 10 [Stabbing] : stabbing [FreeTextEntry1] : 9/10/24 - Patient presents for FUV regarding their lower back pain.  He was last seen nearly 5 months ago.  Patient reports a steady worsening of symptoms since he was seen.  Pain is predominately in the mid to lower back.  He denies any particular inciting events however upon further questioning patient reports a history of multiple falls.  One of his falls resulted in a trip to the emergency room which she reports did not reveal any acute fracture.  Reports that Naprosyn was effective in the past.  He still has a very challenging living situation as he resides on the second floor of a home.  He is currently working to secure a living quarters on a ground floor.  2024 - Patient presents for FUV after a right L4-5, L5-S1 TFESI on 4/10/2024. Patient reports a 90% reduction of lower back pain, he still has no radicular symptoms.  His lower back pain is well managed; he is using meloxicam PRN for pain management.   3/19/2024 - Patient presents for FUV after a L4-5 LESI on 3/1/2024. Patient reports no change in pain levels s/p epidural, he continues to have focal right sided lower back pain.  He still denies any radicular pain to the lower extremities.  Prolonged sitting and standing will exacerbate his pain, he wakes up with the pain and activity throughout the day will make it worse. Patient has been using meloxicam PRN with no meaningful benefit.  Unable to tolerate physical therapy due to increased pain.  In addition patient expresses difficulty with traveling  2024 - The patient presents for initial evaluation regarding their low back pain. Patient was referred by Dr. Karimi. Patient complains of pain in the right side of the lower back, over the past year he has been having a progressive worsening of pain, he denies any significant radicular pain to the lower extremities; his pain is exacerbated with prolonged standing and walking, laying down will provide some pain relief. He has recently started PT and takes Tylenol and Advil PRN for pain management.   Injections:  1) L4-L5 LESI (3/1/2024) 2) Right L4-5, L5-S1 TFESI (4/10/2024)  Pertinent Surgical History: N/A   Imagin) MRI Lumbar Spine (2023) - [facility]  2) MRI Cervical Spine (2023) - [facility]    Physician Disclaimer: I have personally reviewed and confirmed all HPI data with the patient.  [] : no [FreeTextEntry6] : spasms  [de-identified] : Lumbar mri at [facility]

## 2024-10-08 ENCOUNTER — APPOINTMENT (OUTPATIENT)
Dept: TRAUMA SURGERY | Facility: CLINIC | Age: 64
End: 2024-10-08
Payer: MEDICARE

## 2024-10-08 VITALS
SYSTOLIC BLOOD PRESSURE: 113 MMHG | HEART RATE: 113 BPM | DIASTOLIC BLOOD PRESSURE: 81 MMHG | RESPIRATION RATE: 16 BRPM | OXYGEN SATURATION: 97 % | TEMPERATURE: 97.9 F

## 2024-10-08 DIAGNOSIS — K40.90 UNILATERAL INGUINAL HERNIA, W/OUT OBSTRUCTION OR GANGRENE, NOT SPECIFIED AS RECURRENT: ICD-10-CM

## 2024-10-08 PROCEDURE — 99203 OFFICE O/P NEW LOW 30 MIN: CPT

## 2024-10-08 RX ORDER — AMANTADINE 137 MG/1
137 CAPSULE, COATED PELLETS ORAL
Refills: 0 | Status: ACTIVE | COMMUNITY

## 2024-10-08 RX ORDER — CARBIDOPA AND LEVODOPA 25; 100 MG/1; MG/1
25-100 TABLET ORAL
Refills: 0 | Status: ACTIVE | COMMUNITY

## 2024-10-08 RX ORDER — TAMSULOSIN HYDROCHLORIDE 0.4 MG/1
0.4 CAPSULE ORAL
Refills: 0 | Status: ACTIVE | COMMUNITY

## 2024-10-08 RX ORDER — ATORVASTATIN CALCIUM 10 MG/1
10 TABLET, FILM COATED ORAL
Refills: 0 | Status: ACTIVE | COMMUNITY

## 2024-10-08 RX ORDER — MULTIVIT-MIN/FOLIC/VIT K/LYCOP 400-300MCG
50 MCG TABLET ORAL
Refills: 0 | Status: ACTIVE | COMMUNITY

## 2024-10-08 RX ORDER — UBIDECARENONE 200 MG
200 CAPSULE ORAL
Refills: 0 | Status: ACTIVE | COMMUNITY